# Patient Record
Sex: FEMALE | Employment: FULL TIME | URBAN - METROPOLITAN AREA
[De-identification: names, ages, dates, MRNs, and addresses within clinical notes are randomized per-mention and may not be internally consistent; named-entity substitution may affect disease eponyms.]

---

## 2024-05-15 ENCOUNTER — OFFICE VISIT (OUTPATIENT)
Dept: OBGYN CLINIC | Age: 24
End: 2024-05-15
Payer: COMMERCIAL

## 2024-05-15 VITALS
SYSTOLIC BLOOD PRESSURE: 112 MMHG | BODY MASS INDEX: 26.68 KG/M2 | DIASTOLIC BLOOD PRESSURE: 70 MMHG | HEART RATE: 48 BPM | WEIGHT: 166 LBS | HEIGHT: 66 IN

## 2024-05-15 DIAGNOSIS — N92.6 ABNORMAL MENSTRUAL PERIODS: ICD-10-CM

## 2024-05-15 DIAGNOSIS — Z30.433 ENCOUNTER FOR REMOVAL AND REINSERTION OF INTRAUTERINE CONTRACEPTIVE DEVICE (IUD): Primary | ICD-10-CM

## 2024-05-15 DIAGNOSIS — N94.6 DYSMENORRHEA: ICD-10-CM

## 2024-05-15 PROBLEM — M06.9 RHEUMATOID ARTHRITIS (HCC): Status: ACTIVE | Noted: 2023-11-01

## 2024-05-15 PROCEDURE — 58301 REMOVE INTRAUTERINE DEVICE: CPT | Performed by: STUDENT IN AN ORGANIZED HEALTH CARE EDUCATION/TRAINING PROGRAM

## 2024-05-15 PROCEDURE — 58300 INSERT INTRAUTERINE DEVICE: CPT | Performed by: STUDENT IN AN ORGANIZED HEALTH CARE EDUCATION/TRAINING PROGRAM

## 2024-05-15 RX ORDER — METHOTREXATE 25 MG/ML
100 INJECTION, SOLUTION INTRA-ARTERIAL; INTRAMUSCULAR; INTRAVENOUS WEEKLY
COMMUNITY
Start: 2024-03-05

## 2024-05-15 RX ORDER — VALACYCLOVIR HYDROCHLORIDE 500 MG/1
TABLET, FILM COATED ORAL
COMMUNITY
Start: 2024-05-10

## 2024-05-15 NOTE — PROGRESS NOTES
[facility]  [facility]  [address]  [address]  [place] [id number]  Dept: [phone]      OB/GYN   Procedure Note    Ruby Lugo  5/15/2024                       Primary Care Physician: No primary care provider on file.      Subjective:   Ruby Lugo 23 y.o. female  here today to get her Kyleena IUD removed and replaced. She originally had Kyleena placed to help with irregular and painful periods. She has had no side effects with that brand of IUD so she wants to stick with it. She is sexually active and has no pain with IUD. She does not get cycles with her IUD. Has no other complaints today. The side effect profile of the IUD was reviewed. All other forms of family planning and options for treatment of her dysmenorrhea were reviewed with the patient.     Vitals:   Vitals:    05/15/24 1459   BP: 112/70   Site: Right Upper Arm   Position: Sitting   Cuff Size: Medium Adult   Pulse: (!) 48   Weight: 75.3 kg (166 lb)   Height: 1.676 m (5' 6\")       Lab:  Pregnancy Test:  No results found for: \"PREGTESTUR\", \"PREGSERUM\", \"HCG\", \"HCGQUANT\"       Procedure: Removal and Insertion of Kyleena  IUD    Indications:  IUD    Kyleena IUD Removal and Insertion   The patient was counseled on the procedure. Risks, benefits and alternatives were reviewed. The patient is aware that this is diagnostic and not curative and a second procedure may be needed. A consent was reviewed and obtained. Chaperone declined.     A sterile speculum was placed without incident and the string was identified at the cervical portio. The site was then cleansed with Betadine and the string was grasped with a ring forcep and the IUD was removed without incident. IUD was shown to patient and discarded in trash. The IUD was not sent to pathology.  Cervix was re cleansed.  Uterus was sounded to 6.5 cm. The Kyleena IUD was opened and loaded into the delivery system. The wand

## 2024-08-29 ENCOUNTER — PROCEDURE VISIT (OUTPATIENT)
Dept: OBGYN CLINIC | Age: 24
End: 2024-08-29
Payer: COMMERCIAL

## 2024-08-29 VITALS
SYSTOLIC BLOOD PRESSURE: 107 MMHG | BODY MASS INDEX: 26.03 KG/M2 | DIASTOLIC BLOOD PRESSURE: 56 MMHG | HEART RATE: 54 BPM | HEIGHT: 66 IN | WEIGHT: 162 LBS

## 2024-08-29 DIAGNOSIS — Z30.431 IUD CHECK UP: Primary | ICD-10-CM

## 2024-08-29 PROCEDURE — 99213 OFFICE O/P EST LOW 20 MIN: CPT | Performed by: STUDENT IN AN ORGANIZED HEALTH CARE EDUCATION/TRAINING PROGRAM

## 2024-08-29 NOTE — PROGRESS NOTES
Ruby Lugo  8/29/2024    YOB: 2000    HPI:  Ruby Lugo is a 24 y.o. female No obstetric history on file.    The patient was seen and examined today. She is here for Kyleena IUD string check. IUD was placed 5/15/24. She has had no issues with IUD since placement. Denies any abdominal or pelvic pain. Denies any painful sex. Denies any irregular discharge. She has not had a period since placement. She had 2 days of spotting afterwards. She recently moved to [facility] and will be transferring OBGYNs.     OB History   No obstetric history on file.       History reviewed. No pertinent past medical history.    Past Surgical History:   Procedure Laterality Date    INTRAUTERINE DEVICE INSERTION  06/2019    Kyleena       History reviewed. No pertinent family history.    Social History     Socioeconomic History    Marital status: Unknown     Spouse name: Not on file    Number of children: Not on file    Years of education: Not on file    Highest education level: Not on file   Occupational History    Not on file   Tobacco Use    Smoking status: Never    Smokeless tobacco: Never   Substance and Sexual Activity    Alcohol use: Not on file    Drug use: Not on file    Sexual activity: Not on file   Other Topics Concern    Not on file   Social History Narrative    Not on file     Social Determinants of Health     Financial Resource Strain: Not on file   Food Insecurity: No Food Insecurity (10/31/2023)    Received from [facility], [facility], [facility], [facility]    Hunger Vital Sign     Worried About Running Out of Food in the Last Year: Never true     Ran Out of Food in the Last Year: Never true   Transportation Needs: No Transportation Needs (10/31/2023)    Received from [facility], [facility]    PRAPARE - Transportation     Lack of Transportation (Medical): No     Lack of Transportation  (Non-Medical): No   Physical Activity: Not on file   Stress: Not on file   Social Connections: Not on file   Intimate Partner Violence: Low Risk  (10/31/2023)    Received from [facility], [facility], [facility], [facility]    Intimate Partner Violence     Within the last year, have you felt unsafe in your home or been afraid of someone close to you?: No     Within the last year, have you been humiliated or emotionally abused in other ways by someone close to you?: No     Within the last year, have you been kicked, hit, slapped, or otherwise physically hurt by someone close to you?: No     Within the last year, have you been raped or forced to have any kind of sexual activity by someone close to you?: No   Housing Stability: Not on file       MEDICATIONS:  Current Outpatient Medications   Medication Sig Dispense Refill    valACYclovir (VALTREX) 500 MG tablet       methotrexate Sodium 50 MG/2ML chemo injection Inject 4 mLs into the skin once a week       No current facility-administered medications for this visit.       ALLERGIES:  Allergies as of 08/29/2024    (No Known Allergies)        REVIEW OF SYSTEMS:    Constitutional: negative fever, negative chills, negative weight changes   HEENT: negative visual disturbances, negative headaches, negative dizziness   Breast: Negative breast abnormalities, negative breast lumps, negative nipple discharge  Respiratory: negative dyspnea, negative cough, negative SOB  Cardiovascular: negative chest pain,  negative palpitations, negative arrhythmia   Gastrointestinal: negative abdominal pain, negative N/V, negative bowel changes, negative heartburn   Genitourinary: negative dysuria, negative hematuria, negative urinary incontinence, negative vaginal discharge  Dermatological: negative rash, negative pruritis, negative mole changes  Hematologic: negative bruising  Immunologic/Lymphatic: negative recent illness, negative recent sick